# Patient Record
Sex: MALE | Race: WHITE | Employment: FULL TIME | URBAN - NONMETROPOLITAN AREA
[De-identification: names, ages, dates, MRNs, and addresses within clinical notes are randomized per-mention and may not be internally consistent; named-entity substitution may affect disease eponyms.]

---

## 2022-06-21 ENCOUNTER — HOSPITAL ENCOUNTER (OUTPATIENT)
Dept: SLEEP CENTER | Age: 42
Discharge: HOME | End: 2022-06-21
Payer: COMMERCIAL

## 2022-06-21 VITALS — BODY MASS INDEX: 29.2 KG/M2 | WEIGHT: 204 LBS | HEIGHT: 70 IN

## 2022-06-21 DIAGNOSIS — R06.83 PRIMARY SNORING: ICD-10-CM

## 2022-06-21 DIAGNOSIS — R06.81 WITNESSED APNEIC SPELLS: ICD-10-CM

## 2022-06-21 DIAGNOSIS — R40.0 HAS DAYTIME DROWSINESS: ICD-10-CM

## 2022-06-21 PROCEDURE — 95810 POLYSOM 6/> YRS 4/> PARAM: CPT

## 2022-06-21 ASSESSMENT — SLEEP AND FATIGUE QUESTIONNAIRES
HOW LIKELY ARE YOU TO NOD OFF OR FALL ASLEEP WHILE SITTING QUIETLY AFTER LUNCH WITHOUT ALCOHOL: 0
HAS ANYONE NOTICED THAT YOU QUIT BREATHING DURING SLEEP: 3-4 TIMES A WEEK
ARE YOU TIRED AFTER SLEEPING: ALMOST DAILY
HOW OFTEN DO YOU SNORE: ALMOST DAILY
DOES YOUR SNORING BOTHER OTHERS: YES
SNORING VOLUME: VERY LOUD
WHAT TIME DO YOU USUALLY GO TO BED: 75600
HOW LIKELY ARE YOU TO NOD OFF OR FALL ASLEEP WHILE SITTING INACTIVE IN A PUBLIC PLACE: 0
HOW LIKELY ARE YOU TO NOD OFF OR FALL ASLEEP WHILE SITTING AND READING: 0
DO YOU SNORE: YES
USUAL AMOUNT OF TIME TO FALL ASLEEP (MIN): 1
ESS TOTAL SCORE: 4
HOW LIKELY ARE YOU TO NOD OFF OR FALL ASLEEP WHEN YOU ARE A PASSENGER IN A CAR FOR AN HOUR WITHOUT A BREAK: 1
HOW LIKELY ARE YOU TO NOD OFF OR FALL ASLEEP IN A CAR, WHILE STOPPED FOR A FEW MINUTES IN TRAFFIC: 0
DO YOU HAVE HIGH BLOOD PRESSURE: NO
HOW LIKELY ARE YOU TO NOD OFF OR FALL ASLEEP WHILE LYING DOWN TO REST IN THE AFTERNOON WHEN CIRCUMSTANCES PERMIT: 0
HAVE YOU EVER NODDED OFF OR FALLEN ASLEEP WHILE DRIVING: NO
HOW MANY NAPS DO YOU TAKE PER WEEK: 1
WHAT TIME DO YOU USUALLY WAKE UP: 14400
HOW LIKELY ARE YOU TO NOD OFF OR FALL ASLEEP WHILE WATCHING TV: 3
I SLEEP WELL: NO
HOW LIKELY ARE YOU TO NOD OFF OR FALL ASLEEP WHILE SITTING AND TALKING TO SOMEONE: 0
ARE YOU TIRED DURING WAKE TIME: ALMOST DAILY
NUMBER OF TIMES YOU WAKE PER NIGHT: 5

## 2022-06-29 LAB — STATUS: NORMAL

## 2022-08-25 ENCOUNTER — HOSPITAL ENCOUNTER (OUTPATIENT)
Dept: SLEEP CENTER | Age: 42
Discharge: HOME | End: 2022-08-25
Payer: COMMERCIAL

## 2022-08-25 DIAGNOSIS — G47.33 OSA (OBSTRUCTIVE SLEEP APNEA): ICD-10-CM

## 2022-08-25 PROCEDURE — 95811 POLYSOM 6/>YRS CPAP 4/> PARM: CPT

## 2022-08-26 NOTE — PROGRESS NOTES
Patient arrived for a titration sleep study. Procedure was explained and all questions answered. Patient chose [facility] for DME and wore a Resmed P30I nasal pillow.

## 2022-08-31 LAB — STATUS: NORMAL

## 2022-09-01 ENCOUNTER — OFFICE VISIT (OUTPATIENT)
Dept: PULMONOLOGY | Age: 42
End: 2022-09-01
Payer: COMMERCIAL

## 2022-09-01 VITALS
DIASTOLIC BLOOD PRESSURE: 87 MMHG | WEIGHT: 207.7 LBS | HEART RATE: 70 BPM | RESPIRATION RATE: 16 BRPM | OXYGEN SATURATION: 96 % | TEMPERATURE: 97.3 F | BODY MASS INDEX: 29.73 KG/M2 | HEIGHT: 70 IN | SYSTOLIC BLOOD PRESSURE: 137 MMHG

## 2022-09-01 DIAGNOSIS — G47.33 OSA (OBSTRUCTIVE SLEEP APNEA): Primary | ICD-10-CM

## 2022-09-01 PROCEDURE — 99203 OFFICE O/P NEW LOW 30 MIN: CPT | Performed by: INTERNAL MEDICINE

## 2022-09-01 NOTE — LETTER
[facility] Part of [address] JULIEN [address] [place] [address]  Phone: [phone]  Fax: [phone]    Miriam Spivey MD    September 1, 2022     DO Emily [address]    Patient: Moris Stark   MR Number: [id number]   YOB: 1980   Date of Visit: 9/1/2022       Dear Bridger ESTES:    Thank you for referring Major Puga to me for evaluation/treatment. Below are the relevant portions of my assessment and plan of care. If you have questions, please do not hesitate to call me. I look forward to following Manish Schmitt along with you.     Sincerely,      Miriam Spivey MD

## 2022-09-01 NOTE — PATIENT INSTRUCTIONS
SURVEY:    You may be receiving a survey from [employer] regarding your visit today. Please complete the survey to enable us to provide the highest quality of care to you and your family. If you cannot score us a very good on any question, please call the office to discuss how we could have made your experience a very good one. Thank you.

## 2022-09-01 NOTE — PROGRESS NOTES
OUTPATIENT PULMONARY CONSULT NOTE      Patient:  Jorge Luis Wilkins  MRN: [id number]    Consulting Physician: Sherri Ramírez DO  Reason for Consult: Obstructive sleep apnea  Primacy Care Physician: Sherri Ramírez DO    HISTORY OF PRESENT ILLNESS:   The patient is a 40 y.o. male referred for evaluation of obstructive sleep apnea. He has history of long-term snoring for a long time waking up at night with a snoring and sometimes gasping. He was told by his wife about snoring and was having witnessed apnea so he was seen by the primary care physician and a sleep study was ordered. Baseline sleep study was done on 06/21/2022 which was consistent with moderate obstructive sleep apnea with AHI of 26. He had a titration study done on 08/25/2022 and he was titrated to a CPAP of 11 cm and it was recommended to use 10 cm. He does have daytime sleepiness he is not energetic during the daytime. When he wake up in the morning the sleep is not refreshing he still feels tired when he wake up in the morning he does have history of his snoring waking up at night with snoring and gasping and frequent awakening witnessed apnea he can doze off sometimes take nap for 10 minutes and some days he can sleep for an hour if he is not working. He does not complain of shortness of breath no cough no wheezing and no chest tightness. He is able to do his regular activities without much problem. He did have history of smoking which he stopped 5 years ago and smoked for about 14 to 15 years in the past.    Sleep questionnaire  Snores at night, wakes himself snoring. Has witnessed apnea. Wakes up with choking and gasping sensation. Positive dry mouth upon awakening. Positive fatigue and tiredness during the day. Goes to sleep at 8-11pm, wakes up 5 am. It takes 5-10 minutes to fall asleep. Wakes up 1 times at night to go to bathroom. Takes 1 nap during the day sometime( 10 minutes).  No headache in am. No car wrecks or near wrecks because of the sleepiness. No nodding off while driving. No weight gain. No forgetfulness or decreased concentration. No nasal congestion or obstruction at night. No significant caffienated drinks or alcohol. Positive restless feelings in legs at night. No numbness or burning in leg or feet. No leg aches or cramps . No loss of muscle strength when angry or laugh. No hallucination when dozing off or waking up from sleep. No paralysis upon awakening from sleep or going to sleep. occasional teeth grinding, no nightmares, sleep walking. No night time panic attacks. Sleep Medicine 6/21/2022   Sitting and reading 0   Watching TV 3   Sitting, inactive in a public place (e.g. a theatre or a meeting) 0   As a passenger in a car for an hour without a break 1   Lying down to rest in the afternoon when circumstances permit 0   Sitting and talking to someone 0   Sitting quietly after a lunch without alcohol 0   In a car, while stopped for a few minutes in traffic 0   [place] Sleepiness Score 4   Neck circumference (Inches) 15       Past Medical History:        Diagnosis Date    Anxiety     Fatigue        Past Surgical History:        Procedure Laterality Date    CONDYLOMA EXCISION      INGUINAL HERNIA REPAIR Bilateral        Allergies: Allergies   Allergen Reactions    Erythromycin          Home Meds:   Outpatient Encounter Medications as of 9/1/2022   Medication Sig Dispense Refill    [DISCONTINUED] FLUoxetine (PROZAC) 20 MG capsule Take 1 capsule by mouth daily (Patient not taking: Reported on 6/21/2022) 30 capsule 2     No facility-administered encounter medications on file as of 9/1/2022. Social History:   TOBACCO:   reports that he quit smoking about 5 years ago. His smoking use included cigarettes. He has never used smokeless tobacco.  ETOH:   reports current alcohol use of about 2.0 standard drinks per week.   OCCUPATION:      Family History:       Problem Relation Age of Onset    Other Father     Diabetes Paternal Uncle Cancer Maternal Grandmother        Immunizations: There is no immunization history on file for this patient.       REVIEW OF SYSTEMS:  CONSTITUTIONAL: Positive for fatigue negative for  fevers, chills, sweats,  anorexia, and weight loss  EYES:  negative for  double vision, blurred vision, dry eyes, eye discharge, visual disturbance, redness, and icterus  HEENT:  negative for  hearing loss, tinnitus, ear drainage, earaches, nasal congestion, epistaxis, sore throat, hoarseness, voice change, and postnasal drip  RESPIRATORY:  negative for  dry cough, cough with sputum, dyspnea, wheezing, hemoptysis, chest pain, and pleuritic pain  CARDIOVASCULAR:  negative for  chest pain, dyspnea, palpitations, orthopnea, PND, exertional chest pressure/discomfort, fatigue, edema, syncope  GASTROINTESTINAL:  negative for nausea, vomiting, diarrhea, constipation, abdominal pain, abdominal mass, abdominal distention, jaundice, dysphagia, reflux, odynophagia, hematemesis, and hemtochezia  GENITOURINARY:  negative for frequency, dysuria, nocturia, and hematuria  HEMATOLOGIC/LYMPHATIC:  negative for easy bruising, bleeding, lymphadenopathy, and petechiae  ALLERGIC/IMMUNOLOGIC:  negative for recurrent infections, urticaria, hay fever, angioedema, anaphylaxis, and drug reactions  ENDOCRINE:  negative for heat intolerance, cold intolerance, tremor, and weight changes  MUSCULOSKELETAL:  negative for  myalgias, arthralgias, joint swelling, stiff joints, and muscle weakness  NEUROLOGICAL:  negative for headaches, dizziness, seizures, memory problems, speech problems, visual disturbance, gait problems, tremor, dysphagia, weakness, numbness, syncope, and tingling  BEHAVIOR/PSYCH:  negative for decreased sleep, decreased energy level, increased energy level, poor concentration, depressed mood, and anxiety  SLEEP: snoring, choking, gasping, periods of not breathing, excessive daytime sleepiness, falling asleep while at work, driving, reading, watching television, talking, disrupted sleep, naps        Physical Exam:    Vitals: /87   Pulse 70   Temp 97.3 °F (36.3 °C)   Resp 16   Ht 5' 10\" (1.778 m)   Wt 207 lb 11.2 oz (94.2 kg)   SpO2 96%   BMI 29.80 kg/m²   Last 3 weights: Wt Readings from Last 3 Encounters:   09/01/22 207 lb 11.2 oz (94.2 kg)   06/21/22 204 lb (92.5 kg)   05/25/22 204 lb (92.5 kg)     Body mass index is 29.8 kg/m². Physical Examination:   General appearance - alert, well appearing, and in no distress, oriented to person, place, and time, overweight, and acyanotic, in no respiratory distress  Mental status - alert, oriented to person, place, and time  Eyes - pupils equal and reactive, extraocular eye movements intact  Ears - right ear normal, left ear normal  Nose - normal and patent, no erythema, discharge or polyps  Mouth - mucous membranes moist, pharynx normal without lesions and large tongue, small oropharynx, Mallampati 2-3, retrognathia present  Neck - supple, no significant adenopathy  Chest - no tachypnea, retractions or cyanosis bilateral symmetrical chest movement, normal resonance on percussion, air entry is present bilaterally and symmetrical, no expiratory wheezing rhonchi or crackles.   Heart - normal rate, regular rhythm, normal S1, S2, no murmurs, rubs, clicks or gallops  Abdomen - soft, nontender, nondistended, no masses or organomegaly  Neurological - alert, oriented, normal speech, no focal findings or movement disorder noted}  Extremities - peripheral pulses normal, no pedal edema, no clubbing or cyanosis large tongue, small  Skin - normal coloration and turgor, no rashes, no suspicious skin lesions noted       LABS:    CBC: No results found for: WBC, HGB, PLT  BMP: No results found for: NA, K, CL, CO2, BUN, CREATININE, GLUCOSE  Hepatic: No results found for: AST, ALT, ALB, BILITOT, ALKPHOS  Amylase: No results found for: AMYLASE  Lipase: No results found for: LIPASE  CARDIAC ENZYMES: No results found for: CKTOTAL, CKMB, CKMBINDEX, TROPONINI  BNP: No results found for: BNP  Lipids: No results found for: CHOL, HDL    INR: No results found for: INR  Thyroid: No results found for: T4, TSH  Urinalysis: No results found for: BACTERIA, BLOODU, CLARITYU, COLORU, PHUR, PROTEINU, RBCUA, SPECGRAV, BILIRUBINUR, NITRU, WBCUA, LEUKOCYTESUR, GLUCOSEU  Cultures:-  -----------------------------------------------------------------    ABGs: No results found for: PHART, PO2ART, [id number]    Pulmonary Functions Testing Results:    No results found for: FEV1, FVC, [id number], TLC, DLCO    CXR        Assessment and Plan       ICD-10-CM    1. STEPHANY (obstructive sleep apnea)  G47.33           There is no problem list on file for this patient. Assessment:    Moderate obstructive sleep apnea according to sleep study in June and he is titrated with CPAP of 11 cm he does have retrognathia is moderate and Mallampati 2-3 most likely anatomy along with his weight causing him to have his snoring and sleep apnea. I have discussed with him about the sleep apnea sleep study benefit of treatment as he is having symptoms and he will benefit from CPAP therapy he agrees. Will start him on auto CPAP with pressure minimum of 6 and pressure maximum of 16 and will monitor compliance on the CPAP and the symptom. Plan and recommendation:    Start auto CPAP with the min 6 and P max 16. Discussed with patient to use CPAP at least 4 hrs qhs  Wt loss is recommended and discussed  Follow good sleep hygeine instructions  Use humidifier  Questions answered pertaining to diagnosis and management explained importance of compliance with therapy   Need compliance data before next visit once he get CPAP and start using CPAP. Vaccinations recommended annually for flu in fall  Up to date with [facility] an active lifestyle        RTC 3-4  months    It was my pleasure to evaluate Harpreet Mejias today.   Please call with questions. Nguyen Ledezma MD, MD             9/1/2022, 2:39 PM     Please note that this chart was generated using voice recognition Dragon dictation software. Although every effort was made to ensure the accuracy of this automated transcription, some errors in transcription may have occurred.

## 2022-11-10 ENCOUNTER — OFFICE VISIT (OUTPATIENT)
Dept: PULMONOLOGY | Age: 42
End: 2022-11-10
Payer: COMMERCIAL

## 2022-11-10 VITALS
TEMPERATURE: 97.5 F | HEIGHT: 70 IN | RESPIRATION RATE: 18 BRPM | WEIGHT: 213.2 LBS | SYSTOLIC BLOOD PRESSURE: 118 MMHG | HEART RATE: 87 BPM | DIASTOLIC BLOOD PRESSURE: 71 MMHG | BODY MASS INDEX: 30.52 KG/M2 | OXYGEN SATURATION: 96 %

## 2022-11-10 DIAGNOSIS — G47.33 OSA (OBSTRUCTIVE SLEEP APNEA): Primary | ICD-10-CM

## 2022-11-10 PROCEDURE — 99213 OFFICE O/P EST LOW 20 MIN: CPT | Performed by: INTERNAL MEDICINE

## 2022-11-10 NOTE — PROGRESS NOTES
OUTPATIENT PULMONARY PROGRESS NOTE      Patient:  Valerie Rubi  MRN: [id number]    Consulting Physician: Kaleb Gibbons DO  Reason for Consult: Obstructive sleep apnea  Primacy Care Physician: Kaleb Gibbons DO    HISTORY OF PRESENT ILLNESS:   The patient is a 40 y.o. male referred for evaluation of obstructive sleep apnea. He is here today for follow-up he was seen last time 2 months ago. He was started on CPAP around third week of September when he received his CPAP apparently CPAP order was auto CPAP but he was started on 10 cm pressure. He is on nasal pillows with CPAP. According to patient he is taking time to getting adjusted to the CPAP. Sometimes he wake up at night when he gets extra increased pressure from the CPAP. He does not have significant problem with the leak. His dry mouth is actually better since he started using CPAP. He does not find himself gasping or choking at night and snoring at night. He still feels like that the morning are not as good as before but getting better. According patient he is feeling more energy during the daytime more active he does not taking naps anymore and before her CPAP was started he was feeling that he will need to go to sleep at lunchtime and he take nap now he does not do that anymore. He usually goes to sleep around 8-10 PM and wake up around between 4 to 6:30 in the morning because of his work some night he has to be on call and get because and that night he does not use the CPAP as he get frequent because at night. CPAP compliance data is not available. According to patient he is getting data on his phone and he is AHI is between 0.6-0.8 and he is using 7 hours or more most of the night. Initial history and office visit on 09/01/2022  He has history of long-term snoring for a long time waking up at night with a snoring and sometimes gasping.   He was told by his wife about snoring and was having witnessed apnea so he was seen by the primary care physician and a sleep study was ordered. Baseline sleep study was done on 06/21/2022 which was consistent with moderate obstructive sleep apnea with AHI of 26. He had a titration study done on 08/25/2022 and he was titrated to a CPAP of 11 cm and it was recommended to use 10 cm. He does have daytime sleepiness he is not energetic during the daytime. When he wake up in the morning the sleep is not refreshing he still feels tired when he wake up in the morning he does have history of his snoring waking up at night with snoring and gasping and frequent awakening witnessed apnea he can doze off sometimes take nap for 10 minutes and some days he can sleep for an hour if he is not working. He does not complain of shortness of breath no cough no wheezing and no chest tightness. He is able to do his regular activities without much problem. He did have history of smoking which he stopped 5 years ago and smoked for about 14 to 15 years in the past.    Sleep questionnaire on 11/10/22  Denies nocturnal awakening with choking gasping or snoring. . Negative dry mouth upon awakening. Negative fatigue and tiredness during the day. Goes to sleep at 8-10 pm, wakes up 4- 630 am. It takes 10 to 15 minutes to fall asleep. Wakes up 0-1 times at night to go to bathroom. Takes no nap during the day. No headache in am. No car wrecks or near wrecks because of the sleepiness. No nodding off while driving. No weight gain. No forgetfulness or decreased concentration. No nasal congestion or obstruction at night. No significant caffienated drinks or alcohol. Positive restless feelings in legs at night. No numbness or burning in leg or feet. No leg aches or cramps . No loss of muscle strength when angry or laugh. No hallucination when dozing off or waking up from sleep. No paralysis upon awakening from sleep or going to sleep. occasional teeth grinding, no nightmares, sleep walking. No night time panic attacks.      Sleep Medicine 6/21/2022 Sitting and reading 0   Watching TV 3   Sitting, inactive in a public place (e.g. a theatre or a meeting) 0   As a passenger in a car for an hour without a break 1   Lying down to rest in the afternoon when circumstances permit 0   Sitting and talking to someone 0   Sitting quietly after a lunch without alcohol 0   In a car, while stopped for a few minutes in traffic 0   [place] Sleepiness Score 4   Neck circumference (Inches) 15       Past Medical History:        Diagnosis Date    Anxiety     Fatigue        Past Surgical History:        Procedure Laterality Date    CONDYLOMA EXCISION      INGUINAL HERNIA REPAIR Bilateral        Allergies: Allergies   Allergen Reactions    Erythromycin          Home Meds:   No outpatient encounter medications on file as of 11/10/2022. No facility-administered encounter medications on file as of 11/10/2022. Social History:   TOBACCO:   reports that he quit smoking about 5 years ago. His smoking use included cigarettes. He has never used smokeless tobacco.  ETOH:   reports current alcohol use of about 2.0 standard drinks per week. OCCUPATION:      Family History:       Problem Relation Age of Onset    Other Father     Diabetes Paternal Uncle     Cancer Maternal Grandmother        Immunizations: There is no immunization history on file for this patient.       REVIEW OF SYSTEMS:  CONSTITUTIONAL: Negative for fatigue negative for  fevers, chills, sweats,  anorexia, and weight loss  EYES:  negative for  double vision, blurred vision, dry eyes, eye discharge, visual disturbance, redness, and icterus  HEENT:  negative for  hearing loss, tinnitus, ear drainage, earaches, nasal congestion, epistaxis, sore throat, hoarseness, voice change, and postnasal drip  RESPIRATORY:  negative for  dry cough, cough with sputum, dyspnea, wheezing, hemoptysis, chest pain, and pleuritic pain  CARDIOVASCULAR:  negative for  chest pain, dyspnea, palpitations, orthopnea, PND, exertional chest pressure/discomfort, fatigue, edema, syncope  GASTROINTESTINAL:  negative for nausea, vomiting, diarrhea, constipation, abdominal pain, abdominal mass, abdominal distention, jaundice, dysphagia, reflux, odynophagia, hematemesis, and hemtochezia  GENITOURINARY:  negative for frequency, dysuria, nocturia, and hematuria  HEMATOLOGIC/LYMPHATIC:  negative for easy bruising, bleeding, lymphadenopathy, and petechiae  ALLERGIC/IMMUNOLOGIC:  negative for recurrent infections, urticaria, hay fever, angioedema, anaphylaxis, and drug reactions  ENDOCRINE:  negative for heat intolerance, cold intolerance, tremor, and weight changes  MUSCULOSKELETAL:  negative for  myalgias, arthralgias, joint swelling, stiff joints, and muscle weakness  NEUROLOGICAL:  negative for headaches, dizziness, seizures, memory problems, speech problems, visual disturbance, gait problems, tremor, dysphagia, weakness, numbness, syncope, and tingling  BEHAVIOR/PSYCH:  negative for decreased sleep, decreased energy level, increased energy level, poor concentration, depressed mood, and anxiety          Physical Exam:    Vitals: /71 (Site: Right Upper Arm, Position: Sitting, Cuff Size: Medium Adult)   Pulse 87   Temp 97.5 °F (36.4 °C) (Temporal)   Resp 18   Ht 5' 10\" (1.778 m)   Wt 213 lb 3.2 oz (96.7 kg)   SpO2 96%   BMI 30.59 kg/m²   Last 3 weights: Wt Readings from Last 3 Encounters:   11/10/22 213 lb 3.2 oz (96.7 kg)   09/01/22 207 lb 11.2 oz (94.2 kg)   06/21/22 204 lb (92.5 kg)     Body mass index is 30.59 kg/m².     Physical Examination:   General appearance - alert, well appearing, and in no distress, oriented to person, place, and time, overweight, and acyanotic, in no respiratory distress  Mental status - alert, oriented to person, place, and time  Eyes - pupils equal and reactive, extraocular eye movements intact  Ears - right ear normal, left ear normal  Nose - normal and patent, no erythema, discharge or polyps  Mouth - mucous membranes moist, pharynx normal without lesions and large tongue, small oropharynx, Mallampati 2-3, retrognathia present  Neck - supple, no significant adenopathy  Chest - no tachypnea, retractions or cyanosis bilateral symmetrical chest movement, normal resonance on percussion, air entry is present bilaterally and symmetrical, no expiratory wheezing rhonchi or crackles. Heart - normal rate, regular rhythm, normal S1, S2, no murmurs, rubs, clicks or gallops  Abdomen - soft, nontender, nondistended, no masses or organomegaly  Neurological - alert, oriented, normal speech, no focal findings or movement disorder noted}  Extremities - peripheral pulses normal, no pedal edema, no clubbing or cyanosis large tongue, small  Skin - normal coloration and turgor, no rashes, no suspicious skin lesions noted       LABS:    CBC: No results found for: WBC, HGB, PLT  BMP: No results found for: NA, K, CL, CO2, BUN, CREATININE, GLUCOSE  Hepatic: No results found for: AST, ALT, ALB, BILITOT, ALKPHOS  Amylase: No results found for: AMYLASE  Lipase: No results found for: LIPASE  CARDIAC ENZYMES: No results found for: CKTOTAL, CKMB, CKMBINDEX, TROPONINI  BNP: No results found for: BNP  Lipids: No results found for: CHOL, HDL    INR: No results found for: INR  Thyroid: No results found for: T4, TSH  Urinalysis: No results found for: BACTERIA, BLOODU, CLARITYU, COLORU, PHUR, PROTEINU, RBCUA, SPECGRAV, BILIRUBINUR, NITRU, WBCUA, LEUKOCYTESUR, GLUCOSEU  Cultures:-  -----------------------------------------------------------------    ABGs: No results found for: PHART, PO2ART, [id number]    Pulmonary Functions Testing Results:    No results found for: FEV1, FVC, [id number], TLC, DLCO    CXR        Assessment and Plan       ICD-10-CM    1. STEPHANY (obstructive sleep apnea)  G47.33           There is no problem list on file for this patient.     Assessment:    Moderate obstructive sleep apnea according to sleep study in June and he is titrated with CPAP of 11 cm he does have retrognathia is moderate and Mallampati 2-3 most likely anatomy along with his weight causing him to have his snoring and sleep apnea. I have discussed with him about the sleep apnea sleep study benefit of treatment as he is having symptoms and he will benefit from CPAP therapy he agrees. Is currently on CPAP at 10 cm he is getting benefit from CPAP use and his pressure is not too much according to patient his sleep quality is getting better mornings are getting better. He is adjusting to CPAP and doing better. He is getting benefit from CPAP      Plan and recommendation:    Continue CPAP at 10 cm. Discussed with patient to use CPAP at least 4 hrs qhs  Wt loss is recommended and discussed  Follow good sleep hygeine instructions  Use humidifier  Questions answered pertaining to diagnosis and management explained importance of compliance with therapy   Will get compliance data and review compliance data from CPAP  Need compliance data before next visit. Vaccinations recommended annually for flu in fall  Up to date with vaccinations from [facility] an active lifestyle        RTC 6  months    It was my pleasure to evaluate Kenan Renteria today. Please call with questions. Caridad Aguirre MD, MD             11/10/2022, 9:31 AM     Please note that this chart was generated using voice recognition Dragon dictation software. Although every effort was made to ensure the accuracy of this automated transcription, some errors in transcription may have occurred.

## 2022-11-10 NOTE — PATIENT INSTRUCTIONS
SURVEY:    You may be receiving a survey from [employer] regarding your visit today. Please complete the survey to enable us to provide the highest quality of care to you and your family. If you cannot score us a very good on any question, please call the office to discuss how we could have made your experience a very good one. Thank you.

## 2023-05-17 ENCOUNTER — OFFICE VISIT (OUTPATIENT)
Dept: PULMONOLOGY | Age: 43
End: 2023-05-17
Payer: COMMERCIAL

## 2023-05-17 VITALS
BODY MASS INDEX: 33.12 KG/M2 | WEIGHT: 223.6 LBS | OXYGEN SATURATION: 97 % | DIASTOLIC BLOOD PRESSURE: 80 MMHG | TEMPERATURE: 96.2 F | HEART RATE: 68 BPM | RESPIRATION RATE: 16 BRPM | SYSTOLIC BLOOD PRESSURE: 137 MMHG | HEIGHT: 69 IN

## 2023-05-17 DIAGNOSIS — G47.33 OSA (OBSTRUCTIVE SLEEP APNEA): Primary | ICD-10-CM

## 2023-05-17 PROCEDURE — 99213 OFFICE O/P EST LOW 20 MIN: CPT | Performed by: NURSE PRACTITIONER

## 2023-05-17 ASSESSMENT — ENCOUNTER SYMPTOMS
EYES NEGATIVE: 1
RESPIRATORY NEGATIVE: 1
GASTROINTESTINAL NEGATIVE: 1
ALLERGIC/IMMUNOLOGIC NEGATIVE: 1

## 2023-05-17 NOTE — PATIENT INSTRUCTIONS
No protocol.    Last OV: 6/16/2022   Upcoming OV: 6/16/2023   SURVEY:    You may be receiving a survey from [employer] regarding your visit today. Please complete the survey to enable us to provide the highest quality of care to you and your family. If you cannot score us a very good on any question, please call the office to discuss how we could have made your experience a very good one. Thank you.

## 2024-04-05 ENCOUNTER — TELEPHONE (OUTPATIENT)
Dept: PRIMARY CARE CLINIC | Age: 44
End: 2024-04-05

## 2024-04-05 NOTE — TELEPHONE ENCOUNTER
----- Message from Stephanie Leigh sent at 4/5/2024  3:07 PM EDT -----  Subject: Appointment Request    Reason for Call: New Patient/New to Provider Appointment needed: New   Patient Request Appointment    QUESTIONS    Reason for appointment request? No appointments available during search     Additional Information for Provider? pt would like to see Luis Massey -   his wife is a patient   ---------------------------------------------------------------------------  --------------  CALL BACK INFO  [phone]; OK to leave message on voicemail,OK to respond with   electronic message via [employer] portal (only for patients who have   registered [employer] account)  ---------------------------------------------------------------------------  --------------  SCRIPT ANSWERS

## 2024-04-24 ENCOUNTER — OFFICE VISIT (OUTPATIENT)
Dept: PRIMARY CARE CLINIC | Age: 44
End: 2024-04-24
Payer: COMMERCIAL

## 2024-04-24 VITALS
HEIGHT: 68 IN | TEMPERATURE: 98.5 F | OXYGEN SATURATION: 98 % | WEIGHT: 226.5 LBS | DIASTOLIC BLOOD PRESSURE: 80 MMHG | HEART RATE: 67 BPM | SYSTOLIC BLOOD PRESSURE: 124 MMHG | BODY MASS INDEX: 34.33 KG/M2

## 2024-04-24 DIAGNOSIS — Z13.220 LIPID SCREENING: ICD-10-CM

## 2024-04-24 DIAGNOSIS — Z13.1 DIABETES MELLITUS SCREENING: ICD-10-CM

## 2024-04-24 DIAGNOSIS — Z00.00 WELLNESS EXAMINATION: Primary | ICD-10-CM

## 2024-04-24 PROCEDURE — 99386 PREV VISIT NEW AGE 40-64: CPT | Performed by: NURSE PRACTITIONER

## 2024-04-24 SDOH — ECONOMIC STABILITY: FOOD INSECURITY: WITHIN THE PAST 12 MONTHS, THE FOOD YOU BOUGHT JUST DIDN'T LAST AND YOU DIDN'T HAVE MONEY TO GET MORE.: PATIENT DECLINED

## 2024-04-24 SDOH — ECONOMIC STABILITY: HOUSING INSECURITY
IN THE LAST 12 MONTHS, WAS THERE A TIME WHEN YOU DID NOT HAVE A STEADY PLACE TO SLEEP OR SLEPT IN A SHELTER (INCLUDING NOW)?: PATIENT DECLINED

## 2024-04-24 SDOH — ECONOMIC STABILITY: INCOME INSECURITY: HOW HARD IS IT FOR YOU TO PAY FOR THE VERY BASICS LIKE FOOD, HOUSING, MEDICAL CARE, AND HEATING?: PATIENT DECLINED

## 2024-04-24 SDOH — ECONOMIC STABILITY: FOOD INSECURITY: WITHIN THE PAST 12 MONTHS, YOU WORRIED THAT YOUR FOOD WOULD RUN OUT BEFORE YOU GOT MONEY TO BUY MORE.: PATIENT DECLINED

## 2024-04-24 ASSESSMENT — ENCOUNTER SYMPTOMS
ABDOMINAL PAIN: 0
NAUSEA: 0
CONSTIPATION: 0
SHORTNESS OF BREATH: 0
WHEEZING: 0
VOMITING: 0
RHINORRHEA: 0
SORE THROAT: 0
COUGH: 0
DIARRHEA: 0

## 2024-04-24 ASSESSMENT — PATIENT HEALTH QUESTIONNAIRE - PHQ9
SUM OF ALL RESPONSES TO PHQ9 QUESTIONS 1 & 2: 0
SUM OF ALL RESPONSES TO PHQ QUESTIONS 1-9: 0
2. FEELING DOWN, DEPRESSED OR HOPELESS: NOT AT ALL
1. LITTLE INTEREST OR PLEASURE IN DOING THINGS: NOT AT ALL
SUM OF ALL RESPONSES TO PHQ QUESTIONS 1-9: 0

## 2024-04-24 NOTE — PATIENT INSTRUCTIONS
SURVEY:     You may be receiving a survey from [facility] [employer] regarding your visit today.     Please complete the survey to enable us to provide the highest quality of care to you and your family.     If you cannot score us a very good on any question, please call the office to discuss how we could have made your experience a very good one.     Thank you,    Luis Massey, APRN-CNP  Molly Nj, APRN-CNP  Romi, LPN  Ayleen, CMA  Da, CMA  Bina, CMA  Cathie, PCA  Lizzie, CMA  Elisabeth, PM

## 2024-04-24 NOTE — PROGRESS NOTES
Name: Aj Morris  : 1980         Chief Complaint:     Chief Complaint   Patient presents with    Establish Care     Previous PCP Dr. Simon. C/O bilateral side pain,denies injury.       History of Present Illness:      Aj Morris is a 44 y.o.  male who presents with Establish Care (Previous PCP Dr. Simon. C/O bilateral side pain,denies injury.)      Williams is here today for a routine wellness exam and to establish care.    Overall he is feeling well.  He does complain of some rib tenderness especially when his wife gives him a hug.  He denies any specific injury.  He states the pain is not always there but is only present with palpation.  He has a rather sedentary job in an office.  He does try to exercise and eat well routinely.  He is to work out more routinely.  He is taking no current medications.  He does have a history of hyperlipidemia.  He is due for screening labs.    He lives at home with his wife and 17-year-old daughter.          Past Medical History:     Past Medical History:   Diagnosis Date    Fatigue       Reviewed all health maintenance requirements and ordered appropriate tests  There are no preventive care reminders to display for this patient.    Past Surgical History:     Past Surgical History:   Procedure Laterality Date    CONDYLOMA EXCISION      INGUINAL HERNIA REPAIR Bilateral         Medications:       Prior to Admission medications    Medication Sig Start Date End Date Taking? Authorizing Provider   Misc. Devices (CPAP MACHINE) MISC by Does not apply route   Yes Provider, Historical, MD        Allergies:       Erythromycin    Social History:     Tobacco:    reports that he quit smoking about 7 years ago. His smoking use included cigarettes. He has never been exposed to tobacco smoke. He has never used smokeless tobacco.  Alcohol:      reports current alcohol use of about 2.0 standard drinks of alcohol per week.  Drug Use:  reports no history of drug use.    Family

## 2024-06-04 ENCOUNTER — OFFICE VISIT (OUTPATIENT)
Dept: PRIMARY CARE CLINIC | Age: 44
End: 2024-06-04

## 2024-06-04 VITALS
DIASTOLIC BLOOD PRESSURE: 74 MMHG | HEART RATE: 78 BPM | TEMPERATURE: 98.5 F | OXYGEN SATURATION: 99 % | WEIGHT: 225.4 LBS | SYSTOLIC BLOOD PRESSURE: 122 MMHG | RESPIRATION RATE: 20 BRPM | BODY MASS INDEX: 34.16 KG/M2

## 2024-06-04 DIAGNOSIS — J30.1 SEASONAL ALLERGIC RHINITIS DUE TO POLLEN: Primary | ICD-10-CM

## 2024-06-04 DIAGNOSIS — J45.20 MILD INTERMITTENT EXTRINSIC ASTHMA WITHOUT COMPLICATION: ICD-10-CM

## 2024-06-04 RX ORDER — LEVOCETIRIZINE DIHYDROCHLORIDE 5 MG/1
5 TABLET, FILM COATED ORAL NIGHTLY
Qty: 90 TABLET | Refills: 0 | Status: SHIPPED | OUTPATIENT
Start: 2024-06-04

## 2024-06-04 RX ORDER — TRIAMCINOLONE ACETONIDE 40 MG/ML
80 INJECTION, SUSPENSION INTRA-ARTICULAR; INTRAMUSCULAR ONCE
Status: COMPLETED | OUTPATIENT
Start: 2024-06-04 | End: 2024-06-04

## 2024-06-04 RX ADMIN — TRIAMCINOLONE ACETONIDE 80 MG: 40 INJECTION, SUSPENSION INTRA-ARTICULAR; INTRAMUSCULAR at 12:18

## 2024-06-04 ASSESSMENT — ENCOUNTER SYMPTOMS
WHEEZING: 0
SINUS PRESSURE: 0
HOARSE VOICE: 1
SPUTUM PRODUCTION: 1
SHORTNESS OF BREATH: 0
CONSTIPATION: 0
VOMITING: 0
TROUBLE SWALLOWING: 0
CHEST TIGHTNESS: 0
ABDOMINAL PAIN: 0
DIARRHEA: 0
SORE THROAT: 0
HEMOPTYSIS: 0
FREQUENT THROAT CLEARING: 0
EYE ITCHING: 0
NAUSEA: 0
SWOLLEN GLANDS: 0
COUGH: 1
SINUS PAIN: 0
HEARTBURN: 0
DIFFICULTY BREATHING: 0
RHINORRHEA: 1

## 2024-06-04 ASSESSMENT — PATIENT HEALTH QUESTIONNAIRE - PHQ9
1. LITTLE INTEREST OR PLEASURE IN DOING THINGS: NOT AT ALL
SUM OF ALL RESPONSES TO PHQ QUESTIONS 1-9: 0
SUM OF ALL RESPONSES TO PHQ9 QUESTIONS 1 & 2: 0
SUM OF ALL RESPONSES TO PHQ QUESTIONS 1-9: 0
SUM OF ALL RESPONSES TO PHQ QUESTIONS 1-9: 0
2. FEELING DOWN, DEPRESSED OR HOPELESS: NOT AT ALL
SUM OF ALL RESPONSES TO PHQ QUESTIONS 1-9: 0

## 2024-06-04 NOTE — PROGRESS NOTES
Name: Aj Morris  : 1980         Chief Complaint:     Chief Complaint   Patient presents with    Chest Congestion     X 2 weeks,intermittently, no fever.        History of Present Illness:      Aj Morris is a 44 y.o.  male who presents with Chest Congestion (X 2 weeks,intermittently, no fever. )      Williams is here today for an acute care office visit.    Allergies  Presents for initial visit. He complains of congestion, cough, headaches, hoarse voice, itchy nose, rhinorrhea and sneezing. He reports no ear pain, eye itching, fatigue, fever, plugged ear sensation, rash, sinus pressure, sore throat, swollen glands or wheezing. The problem occurs daily. Timing of symptoms is not consistent. Symptom severity has been moderate, interfering with activities and interfering with sleep. Symptoms are present in the spring. Allergy triggers include animal exposure, dust, grass, pollens, weather changes and weeds. Allergy triggers do not include emotional upset, exercise, fumes, occupational exposure, smoke exposure or strong odors. Allergens in current environment include grass, dogs and cats. Past treatments include oral decongestants. The treatment provided mild relief. His past medical history is significant for allergies, asthma and bronchitis. There is no history of atopic dermatitis, nasal polyps or sinus disease. He has had no previous allergy testing.   Asthma  He complains of cough, hoarse voice and sputum production. There is no chest tightness, difficulty breathing, frequent throat clearing, hemoptysis, shortness of breath or wheezing. This is a recurrent problem. The current episode started 1 to 4 weeks ago. The problem occurs intermittently. The problem has been unchanged. The cough is productive of sputum. Associated symptoms include headaches, malaise/fatigue, nasal congestion, postnasal drip, rhinorrhea and sneezing. Pertinent negatives include no appetite change, chest pain, dyspnea on

## 2024-06-04 NOTE — PROGRESS NOTES
After obtaining consent, and per orders of   Luis Massey CNP, injection of Kenalog 80 mg given in Left deltoid by NORMA SHUKLA LPN. Patient instructed to remain in clinic for 20 minutes afterwards, and to report any adverse reaction to me immediately.

## 2024-06-04 NOTE — PATIENT INSTRUCTIONS
SURVEY:     You may be receiving a survey from [facility] [email or web address] regarding your visit today.     Please complete the survey to enable us to provide the highest quality of care to you and your family.     If you cannot score us a very good on any question, please call the office to discuss how we could have made your experience a very good one.     Thank you,    Luis Massey, APRN-CNP  Molly Nj, APRN-CNP  Romi, LPN  Ayleen, CMA  Da, CMA  Bina, CMA  Cathie, PCA  Lizzie, CMA  Elisabeth, PM

## 2024-06-05 ENCOUNTER — TELEPHONE (OUTPATIENT)
Dept: PRIMARY CARE CLINIC | Age: 44
End: 2024-06-05

## 2024-09-27 LAB
CHOLEST SERPL-MCNC: 274 MG/DL (ref 0–199)
CHOLESTEROL/HDL RATIO: 6
GLUCOSE SERPL-MCNC: 121 MG/DL (ref 74–99)
HDLC SERPL-MCNC: 44 MG/DL
LDLC SERPL CALC-MCNC: 177 MG/DL (ref 0–100)
PATIENT FASTING?: YES
TRIGL SERPL-MCNC: 265 MG/DL
VLDLC SERPL CALC-MCNC: 53 MG/DL

## 2024-09-30 ENCOUNTER — TELEPHONE (OUTPATIENT)
Dept: PRIMARY CARE CLINIC | Age: 44
End: 2024-09-30

## 2024-09-30 DIAGNOSIS — E78.5 DYSLIPIDEMIA: Primary | ICD-10-CM

## 2024-09-30 NOTE — TELEPHONE ENCOUNTER
----- Message from LILLIE Holland CNP sent at 9/30/2024 12:12 PM EDT -----  \"Bad\" cholesterol still pretty high. Would recommend he get  (lipoprotein a) blood test to see if this is hereditary. Please let me know. Thank you.

## 2024-10-02 NOTE — TELEPHONE ENCOUNTER
Patient called the office back to receive lab results. Patient is willing to have the Lipoprotein lab drawn.     Please advise.

## 2024-11-04 ENCOUNTER — HOSPITAL ENCOUNTER (OUTPATIENT)
Age: 44
Discharge: HOME | End: 2024-11-04
Payer: COMMERCIAL

## 2024-11-04 DIAGNOSIS — E78.5 DYSLIPIDEMIA: ICD-10-CM

## 2024-11-04 PROCEDURE — 83695 ASSAY OF LIPOPROTEIN(A): CPT

## 2024-11-04 PROCEDURE — 36415 COLL VENOUS BLD VENIPUNCTURE: CPT

## 2024-11-06 ENCOUNTER — TELEPHONE (OUTPATIENT)
Dept: PRIMARY CARE CLINIC | Age: 44
End: 2024-11-06

## 2024-11-06 LAB — LPA SERPL-MCNC: <6 MG/DL

## 2024-11-06 NOTE — TELEPHONE ENCOUNTER
----- Message from LILLIE Holland CNP sent at 11/6/2024  8:30 AM EST -----  Lipoprotein a is normal.  His elevated cholesterol is most likely just related to his diet.

## 2025-01-21 ENCOUNTER — OFFICE VISIT (OUTPATIENT)
Dept: PRIMARY CARE CLINIC | Age: 45
End: 2025-01-21
Payer: COMMERCIAL

## 2025-01-21 VITALS
OXYGEN SATURATION: 98 % | TEMPERATURE: 97.4 F | SYSTOLIC BLOOD PRESSURE: 148 MMHG | DIASTOLIC BLOOD PRESSURE: 102 MMHG | BODY MASS INDEX: 36.51 KG/M2 | HEART RATE: 68 BPM | WEIGHT: 240.9 LBS

## 2025-01-21 DIAGNOSIS — R73.01 ELEVATED FASTING GLUCOSE: ICD-10-CM

## 2025-01-21 DIAGNOSIS — F43.9 STRESS: Primary | ICD-10-CM

## 2025-01-21 DIAGNOSIS — R03.0 ELEVATED BLOOD PRESSURE READING: ICD-10-CM

## 2025-01-21 DIAGNOSIS — Z13.31 POSITIVE DEPRESSION SCREENING: ICD-10-CM

## 2025-01-21 LAB — HBA1C MFR BLD: 6.2 %

## 2025-01-21 PROCEDURE — 83036 HEMOGLOBIN GLYCOSYLATED A1C: CPT | Performed by: NURSE PRACTITIONER

## 2025-01-21 PROCEDURE — 99214 OFFICE O/P EST MOD 30 MIN: CPT | Performed by: NURSE PRACTITIONER

## 2025-01-21 RX ORDER — DULOXETIN HYDROCHLORIDE 20 MG/1
20 CAPSULE, DELAYED RELEASE ORAL DAILY
Qty: 30 CAPSULE | Refills: 0 | Status: SHIPPED | OUTPATIENT
Start: 2025-01-21

## 2025-01-21 SDOH — ECONOMIC STABILITY: FOOD INSECURITY: WITHIN THE PAST 12 MONTHS, YOU WORRIED THAT YOUR FOOD WOULD RUN OUT BEFORE YOU GOT MONEY TO BUY MORE.: NEVER TRUE

## 2025-01-21 SDOH — ECONOMIC STABILITY: INCOME INSECURITY: IN THE LAST 12 MONTHS, WAS THERE A TIME WHEN YOU WERE NOT ABLE TO PAY THE MORTGAGE OR RENT ON TIME?: NO

## 2025-01-21 SDOH — ECONOMIC STABILITY: TRANSPORTATION INSECURITY
IN THE PAST 12 MONTHS, HAS LACK OF TRANSPORTATION KEPT YOU FROM MEETINGS, WORK, OR FROM GETTING THINGS NEEDED FOR DAILY LIVING?: NO

## 2025-01-21 SDOH — ECONOMIC STABILITY: FOOD INSECURITY: WITHIN THE PAST 12 MONTHS, THE FOOD YOU BOUGHT JUST DIDN'T LAST AND YOU DIDN'T HAVE MONEY TO GET MORE.: NEVER TRUE

## 2025-01-21 SDOH — ECONOMIC STABILITY: TRANSPORTATION INSECURITY
IN THE PAST 12 MONTHS, HAS THE LACK OF TRANSPORTATION KEPT YOU FROM MEDICAL APPOINTMENTS OR FROM GETTING MEDICATIONS?: NO

## 2025-01-21 ASSESSMENT — PATIENT HEALTH QUESTIONNAIRE - PHQ9
SUM OF ALL RESPONSES TO PHQ QUESTIONS 1-9: 17
8. MOVING OR SPEAKING SO SLOWLY THAT OTHER PEOPLE COULD HAVE NOTICED. OR THE OPPOSITE, BEING SO FIGETY OR RESTLESS THAT YOU HAVE BEEN MOVING AROUND A LOT MORE THAN USUAL: SEVERAL DAYS
SUM OF ALL RESPONSES TO PHQ QUESTIONS 1-9: 17
8. MOVING OR SPEAKING SO SLOWLY THAT OTHER PEOPLE COULD HAVE NOTICED. OR THE OPPOSITE - BEING SO FIDGETY OR RESTLESS THAT YOU HAVE BEEN MOVING AROUND A LOT MORE THAN USUAL: SEVERAL DAYS
7. TROUBLE CONCENTRATING ON THINGS, SUCH AS READING THE NEWSPAPER OR WATCHING TELEVISION: SEVERAL DAYS
2. FEELING DOWN, DEPRESSED OR HOPELESS: MORE THAN HALF THE DAYS
SUM OF ALL RESPONSES TO PHQ9 QUESTIONS 1 & 2: 5
1. LITTLE INTEREST OR PLEASURE IN DOING THINGS: NEARLY EVERY DAY
5. POOR APPETITE OR OVEREATING: NEARLY EVERY DAY
4. FEELING TIRED OR HAVING LITTLE ENERGY: NEARLY EVERY DAY
9. THOUGHTS THAT YOU WOULD BE BETTER OFF DEAD, OR OF HURTING YOURSELF: NOT AT ALL
6. FEELING BAD ABOUT YOURSELF - OR THAT YOU ARE A FAILURE OR HAVE LET YOURSELF OR YOUR FAMILY DOWN: SEVERAL DAYS
SUM OF ALL RESPONSES TO PHQ QUESTIONS 1-9: 17
9. THOUGHTS THAT YOU WOULD BE BETTER OFF DEAD, OR OF HURTING YOURSELF: NOT AT ALL
SUM OF ALL RESPONSES TO PHQ QUESTIONS 1-9: 17
7. TROUBLE CONCENTRATING ON THINGS, SUCH AS READING THE NEWSPAPER OR WATCHING TELEVISION: SEVERAL DAYS
6. FEELING BAD ABOUT YOURSELF - OR THAT YOU ARE A FAILURE OR HAVE LET YOURSELF OR YOUR FAMILY DOWN: SEVERAL DAYS
4. FEELING TIRED OR HAVING LITTLE ENERGY: NEARLY EVERY DAY
3. TROUBLE FALLING OR STAYING ASLEEP: NEARLY EVERY DAY
SUM OF ALL RESPONSES TO PHQ QUESTIONS 1-9: 17
SUM OF ALL RESPONSES TO PHQ9 QUESTIONS 1 & 2: 5
1. LITTLE INTEREST OR PLEASURE IN DOING THINGS: NEARLY EVERY DAY
5. POOR APPETITE OR OVEREATING: NEARLY EVERY DAY
3. TROUBLE FALLING OR STAYING ASLEEP: NEARLY EVERY DAY
2. FEELING DOWN, DEPRESSED OR HOPELESS: MORE THAN HALF THE DAYS
10. IF YOU CHECKED OFF ANY PROBLEMS, HOW DIFFICULT HAVE THESE PROBLEMS MADE IT FOR YOU TO DO YOUR WORK, TAKE CARE OF THINGS AT HOME, OR GET ALONG WITH OTHER PEOPLE: SOMEWHAT DIFFICULT
10. IF YOU CHECKED OFF ANY PROBLEMS, HOW DIFFICULT HAVE THESE PROBLEMS MADE IT FOR YOU TO DO YOUR WORK, TAKE CARE OF THINGS AT HOME, OR GET ALONG WITH OTHER PEOPLE: SOMEWHAT DIFFICULT

## 2025-01-21 ASSESSMENT — ENCOUNTER SYMPTOMS
SORE THROAT: 0
ABDOMINAL PAIN: 0
VOMITING: 0
CONSTIPATION: 0
SHORTNESS OF BREATH: 0
COUGH: 0
RHINORRHEA: 0
WHEEZING: 0
DIARRHEA: 0
FEELING OF CHOKING: 0
HYPERVENTILATION: 1
NAUSEA: 0

## 2025-01-21 NOTE — PATIENT INSTRUCTIONS
SURVEY:     You may be receiving a survey from [facility] [employer] regarding your visit today.     Please complete the survey to enable us to provide the highest quality of care to you and your family.     If you cannot score us a very good on any question, please call the office to discuss how we could have made your experience a very good one.     Thank you,    Luis Massey, APRN-CNP  Molly Nj, APRN-CNP  Romi, LPN  Ayleen, CMA  Da, CMA  Bina, CMA  Cathie, PCA  Lizzie, CMA  Elisabeth, PM

## 2025-01-21 NOTE — PROGRESS NOTES
Name: Aj Morris  : 1980         Chief Complaint:     Chief Complaint   Patient presents with    Anxiety     Anxiety, dizziness, left arm tingling, sweating, x 3 weeks.        History of Present Illness:      Aj Morris is a 44 y.o.  male who presents with Anxiety (Anxiety, dizziness, left arm tingling, sweating, x 3 weeks. )      Williams is here today for a routine office visit.    He states there has been quite a bit of increased stress in his life over the past several months.  He states there are issues related to his family and work.  His father recently was placed in a nursing home which was very stressful to him and he is having trouble with finding time to visit him.  He is also working extremely long hours at work.  He states he did recently change jobs to help out a friend but this is causing some additional issues.  He states he is irritable a lot of the time.  He states he is able to sleep.  He is drinking about 4-5 beers each night to help unwind.    Recently he said he became very upset at work and had some chest pain/palpitations.  He states he was able to relax in the pain went away.  He has had no further chest pain.  His blood pressure is little elevated in the office today.  See below for further comments.    Anxiety  Presents for initial visit. Onset was 1 to 4 weeks ago. The problem has been gradually worsening. Symptoms include decreased concentration, depressed mood, excessive worry, hyperventilation, irritability, malaise, muscle tension, nervous/anxious behavior, palpitations and restlessness. Patient reports no chest pain, compulsions, confusion, dizziness, dry mouth, feeling of choking, insomnia, nausea, obsessions, panic, shortness of breath or suicidal ideas. Symptoms occur most days. The severity of symptoms is causing significant distress and moderate. The symptoms are aggravated by work stress and family issues. The quality of sleep is good. Nighttime awakenings:

## 2025-02-04 ENCOUNTER — OFFICE VISIT (OUTPATIENT)
Dept: PRIMARY CARE CLINIC | Age: 45
End: 2025-02-04
Payer: COMMERCIAL

## 2025-02-04 VITALS
SYSTOLIC BLOOD PRESSURE: 128 MMHG | DIASTOLIC BLOOD PRESSURE: 74 MMHG | WEIGHT: 236 LBS | BODY MASS INDEX: 35.77 KG/M2 | TEMPERATURE: 97.2 F | HEART RATE: 70 BPM | OXYGEN SATURATION: 97 %

## 2025-02-04 DIAGNOSIS — F43.9 STRESS: ICD-10-CM

## 2025-02-04 PROCEDURE — 99213 OFFICE O/P EST LOW 20 MIN: CPT | Performed by: NURSE PRACTITIONER

## 2025-02-04 RX ORDER — DULOXETIN HYDROCHLORIDE 20 MG/1
20 CAPSULE, DELAYED RELEASE ORAL DAILY
Qty: 90 CAPSULE | Refills: 1 | Status: SHIPPED | OUTPATIENT
Start: 2025-02-04

## 2025-02-04 SDOH — ECONOMIC STABILITY: FOOD INSECURITY: WITHIN THE PAST 12 MONTHS, THE FOOD YOU BOUGHT JUST DIDN'T LAST AND YOU DIDN'T HAVE MONEY TO GET MORE.: NEVER TRUE

## 2025-02-04 SDOH — ECONOMIC STABILITY: FOOD INSECURITY: WITHIN THE PAST 12 MONTHS, YOU WORRIED THAT YOUR FOOD WOULD RUN OUT BEFORE YOU GOT MONEY TO BUY MORE.: NEVER TRUE

## 2025-02-04 ASSESSMENT — ENCOUNTER SYMPTOMS
FEELING OF CHOKING: 0
RHINORRHEA: 0
SORE THROAT: 0
SHORTNESS OF BREATH: 0
WHEEZING: 0
CONSTIPATION: 0
DIARRHEA: 0
VOMITING: 0
HYPERVENTILATION: 0
COUGH: 0
NAUSEA: 0
ABDOMINAL PAIN: 0

## 2025-02-04 ASSESSMENT — PATIENT HEALTH QUESTIONNAIRE - PHQ9
1. LITTLE INTEREST OR PLEASURE IN DOING THINGS: NOT AT ALL
8. MOVING OR SPEAKING SO SLOWLY THAT OTHER PEOPLE COULD HAVE NOTICED. OR THE OPPOSITE, BEING SO FIGETY OR RESTLESS THAT YOU HAVE BEEN MOVING AROUND A LOT MORE THAN USUAL: NOT AT ALL
6. FEELING BAD ABOUT YOURSELF - OR THAT YOU ARE A FAILURE OR HAVE LET YOURSELF OR YOUR FAMILY DOWN: NOT AT ALL
10. IF YOU CHECKED OFF ANY PROBLEMS, HOW DIFFICULT HAVE THESE PROBLEMS MADE IT FOR YOU TO DO YOUR WORK, TAKE CARE OF THINGS AT HOME, OR GET ALONG WITH OTHER PEOPLE: NOT DIFFICULT AT ALL
9. THOUGHTS THAT YOU WOULD BE BETTER OFF DEAD, OR OF HURTING YOURSELF: NOT AT ALL
7. TROUBLE CONCENTRATING ON THINGS, SUCH AS READING THE NEWSPAPER OR WATCHING TELEVISION: NOT AT ALL
SUM OF ALL RESPONSES TO PHQ9 QUESTIONS 1 & 2: 0
SUM OF ALL RESPONSES TO PHQ QUESTIONS 1-9: 0
2. FEELING DOWN, DEPRESSED OR HOPELESS: NOT AT ALL
5. POOR APPETITE OR OVEREATING: NOT AT ALL
4. FEELING TIRED OR HAVING LITTLE ENERGY: NOT AT ALL
3. TROUBLE FALLING OR STAYING ASLEEP: NOT AT ALL
SUM OF ALL RESPONSES TO PHQ QUESTIONS 1-9: 0

## 2025-02-04 NOTE — PATIENT INSTRUCTIONS
SURVEY:     You may be receiving a survey from [facility] regarding your visit today.     Please complete the survey to enable us to provide the highest quality of care to you and your family.     If you cannot score us a very good on any question, please call the office to discuss how we could have made your experience a very good one.     Thank you,    Luis Massey, APRN-CNP  Molly Nj, APRN-CNP  Romi, LPN  Ayleen, CMA  Da, CMA  Bina, CMA  Cathie, PCA  Lizzie, CMA  Elisabeth, PM

## 2025-02-05 NOTE — PROGRESS NOTES
Name: Aj Morris  : 1980         Chief Complaint:     Chief Complaint   Patient presents with    Mental Health Problem     2 week check. Patient doing well on duloxetine.        History of Present Illness:      Aj Morris is a 44 y.o.  male who presents with Mental Health Problem (2 week check. Patient doing well on duloxetine. )      Williams is here today for a routine office visit.    2025-He states there has been quite a bit of increased stress in his life over the past several months.  He states there are issues related to his family and work.  His father recently was placed in a nursing home which was very stressful to him and he is having trouble with finding time to visit him.  He is also working extremely long hours at work.  He states he did recently change jobs to help out a friend but this is causing some additional issues.  He states he is irritable a lot of the time.  He states he is able to sleep.  He is drinking about 4-5 beers each night to help unwind.    Recently he said he became very upset at work and had some chest pain/palpitations.  He states he was able to relax in the pain went away.  He has had no further chest pain.  His blood pressure is little elevated in the office today.      UPDATE 2025- feeling much better, better mood, less worry, not irritated with people. No issues with medication. Feels current does is very helpful. See below for further comments.    Anxiety  Presents for follow-up visit. Onset was 1 to 4 weeks ago. The problem has been gradually worsening. Patient reports no chest pain, compulsions, confusion, decreased concentration, depressed mood, dizziness, dry mouth, excessive worry, feeling of choking, hyperventilation, impotence, insomnia, irritability, malaise, muscle tension, nausea, nervous/anxious behavior, obsessions, palpitations, panic, restlessness, shortness of breath or suicidal ideas. Symptoms occur occasionally. The severity of

## 2025-04-21 ENCOUNTER — TELEPHONE (OUTPATIENT)
Dept: PRIMARY CARE CLINIC | Age: 45
End: 2025-04-21

## 2025-05-06 ENCOUNTER — OFFICE VISIT (OUTPATIENT)
Dept: PRIMARY CARE CLINIC | Age: 45
End: 2025-05-06
Payer: COMMERCIAL

## 2025-05-06 VITALS
SYSTOLIC BLOOD PRESSURE: 120 MMHG | RESPIRATION RATE: 18 BRPM | DIASTOLIC BLOOD PRESSURE: 84 MMHG | WEIGHT: 240 LBS | BODY MASS INDEX: 34.36 KG/M2 | TEMPERATURE: 98 F | HEART RATE: 71 BPM | OXYGEN SATURATION: 96 % | HEIGHT: 70 IN

## 2025-05-06 DIAGNOSIS — G47.33 OSA ON CPAP: ICD-10-CM

## 2025-05-06 DIAGNOSIS — F43.9 STRESS: Primary | ICD-10-CM

## 2025-05-06 PROCEDURE — 99214 OFFICE O/P EST MOD 30 MIN: CPT | Performed by: NURSE PRACTITIONER

## 2025-05-06 ASSESSMENT — ENCOUNTER SYMPTOMS
COUGH: 0
FEELING OF CHOKING: 0
WHEEZING: 0
SWOLLEN GLANDS: 0
SORE THROAT: 0
CONSTIPATION: 0
DIARRHEA: 0
ABDOMINAL PAIN: 0
NAUSEA: 0
VISUAL CHANGE: 0
HYPERVENTILATION: 0
CHANGE IN BOWEL HABIT: 0
RHINORRHEA: 0
VOMITING: 0
SHORTNESS OF BREATH: 0

## 2025-05-06 NOTE — PATIENT INSTRUCTIONS
SURVEY:     You may be receiving a survey from [facility] [employer] regarding your visit today.     Please complete the survey to enable us to provide the highest quality of care to you and your family.     If you cannot score us a very good on any question, please call the office to discuss how we could have made your experience a very good one.     Thank you,    Luis Massey, APRN-CNP  Molly Nj, APRN-CNP  Romi, LPN  Ayleen, CMA  Da, CMA  Bina, CMA  Cathie, PCA  Lizzie, CMA  Elisabeth, PM

## 2025-05-06 NOTE — PROGRESS NOTES
arthralgias, gait problem, joint swelling, myalgias, neck pain and neck stiffness.   Skin:  Negative for rash.   Neurological:  Negative for dizziness, vertigo, syncope, weakness, light-headedness, numbness and headaches.   Psychiatric/Behavioral:  Positive for sleep disturbance (STABLE). Negative for agitation, behavioral problems, confusion, decreased concentration, dysphoric mood, hallucinations, self-injury and suicidal ideas. The patient is not nervous/anxious, does not have insomnia and is not hyperactive.        Physical Exam:   Vitals:  /84 (BP Site: Left Upper Arm, Patient Position: Sitting)   Pulse 71   Temp 98 °F (36.7 °C) (Temporal)   Resp 18   Ht 1.778 m (5' 10\")   Wt 108.9 kg (240 lb)   SpO2 96%   BMI 34.44 kg/m²     Physical Exam  Vitals and nursing note reviewed.   Constitutional:       General: He is not in acute distress.     Appearance: Normal appearance. He is not ill-appearing.   HENT:      Mouth/Throat:      Mouth: Mucous membranes are moist.      Pharynx: Oropharynx is clear.   Eyes:      General: No scleral icterus.     Conjunctiva/sclera: Conjunctivae normal.   Cardiovascular:      Rate and Rhythm: Normal rate and regular rhythm.   Pulmonary:      Effort: Pulmonary effort is normal.      Breath sounds: Normal breath sounds.   Abdominal:      General: Bowel sounds are normal. There is no distension.      Palpations: Abdomen is soft.      Tenderness: There is no abdominal tenderness.   Musculoskeletal:      Cervical back: Normal range of motion and neck supple.      Right lower leg: No edema.      Left lower leg: No edema.   Skin:     General: Skin is warm and dry.      Findings: No rash.   Neurological:      Mental Status: He is alert and oriented to person, place, and time.   Psychiatric:         Attention and Perception: Attention normal.         Mood and Affect: Mood and affect normal. Mood is not anxious or depressed. Affect is not tearful.         Speech: Speech normal.

## 2025-05-14 ENCOUNTER — TELEMEDICINE ON DEMAND (OUTPATIENT)
Age: 45
End: 2025-05-14
Payer: COMMERCIAL

## 2025-05-14 DIAGNOSIS — J06.9 VIRAL URI WITH COUGH: Primary | ICD-10-CM

## 2025-05-14 PROCEDURE — 99213 OFFICE O/P EST LOW 20 MIN: CPT | Performed by: NURSE PRACTITIONER

## 2025-05-14 RX ORDER — BROMPHENIRAMINE MALEATE, PSEUDOEPHEDRINE HYDROCHLORIDE, AND DEXTROMETHORPHAN HYDROBROMIDE 2; 30; 10 MG/5ML; MG/5ML; MG/5ML
5-10 SYRUP ORAL 4 TIMES DAILY PRN
Qty: 200 ML | Refills: 0 | Status: SHIPPED | OUTPATIENT
Start: 2025-05-14

## 2025-05-14 ASSESSMENT — ENCOUNTER SYMPTOMS
SHORTNESS OF BREATH: 0
SWOLLEN GLANDS: 1
SINUS PAIN: 0
SORE THROAT: 1
COUGH: 1
WHEEZING: 1
RHINORRHEA: 1

## 2025-05-14 NOTE — PROGRESS NOTES
Aj Morris (:  1980) is a Established patient, here for evaluation of the following:    Cold Symptoms (X3 days, productive cough, green sputum, nasal congestion, sweats/chills)       Assessment & Plan:  Below is the assessment and plan developed based on review of pertinent history, physical exam, labs, studies, and medications.  1. Viral URI with cough  -     brompheniramine-pseudoephedrine-DM 2-30-10 MG/5ML syrup; Take 5-10 mLs by mouth 4 times daily as needed for Cough, Disp-200 mL, R-0Normal  Increase fluids, Rest, Use OTC pain reliever/fever reducer of choice, Teaspoon of honey for coughing fits. COVID/Flu test. Repeat after 48 hours to confirm negative if still symptomatic.  The patient would benefit from future follow up with their usual PCP. As of the end of their Virtualist Visit today, follow up visit status is as follows: Patient to follow up as previously instructed by PCP    Call if no improvement    Return if symptoms worsen or fail to improve.    Subjective:   Cold Symptoms   This is a new problem. The current episode started in the past 7 days. The problem has been gradually worsening. There has been no fever. Associated symptoms include congestion, coughing, headaches, rhinorrhea, sneezing, a sore throat, swollen glands and wheezing (improving). Pertinent negatives include no ear pain, plugged ear sensation or sinus pain. Treatments tried: dayquil. The treatment provided no relief.     Review of Systems   Constitutional:  Positive for chills, diaphoresis and fatigue. Negative for fever.   HENT:  Positive for congestion, postnasal drip, rhinorrhea, sneezing and sore throat. Negative for ear pain and sinus pain.    Respiratory:  Positive for cough and wheezing (improving). Negative for shortness of breath.    Neurological:  Positive for headaches.   Hematological:  Positive for adenopathy (submandibular).       Objective:  Patient-Reported Vitals  Patient-Reported Systolic (Top): 121

## 2025-06-27 ENCOUNTER — OFFICE VISIT (OUTPATIENT)
Dept: PRIMARY CARE CLINIC | Age: 45
End: 2025-06-27
Payer: COMMERCIAL

## 2025-06-27 VITALS
BODY MASS INDEX: 34.35 KG/M2 | OXYGEN SATURATION: 97 % | DIASTOLIC BLOOD PRESSURE: 76 MMHG | WEIGHT: 239.4 LBS | SYSTOLIC BLOOD PRESSURE: 120 MMHG | TEMPERATURE: 97.4 F | HEART RATE: 75 BPM

## 2025-06-27 DIAGNOSIS — R13.19 ESOPHAGEAL DYSPHAGIA: ICD-10-CM

## 2025-06-27 DIAGNOSIS — F43.9 STRESS: Primary | ICD-10-CM

## 2025-06-27 PROCEDURE — 99214 OFFICE O/P EST MOD 30 MIN: CPT | Performed by: NURSE PRACTITIONER

## 2025-06-27 RX ORDER — DULOXETIN HYDROCHLORIDE 20 MG/1
20 CAPSULE, DELAYED RELEASE ORAL DAILY
Qty: 90 CAPSULE | Refills: 1 | Status: SHIPPED | OUTPATIENT
Start: 2025-06-27

## 2025-06-27 RX ORDER — PANTOPRAZOLE SODIUM 40 MG/1
40 TABLET, DELAYED RELEASE ORAL
Qty: 90 TABLET | Refills: 1 | Status: SHIPPED | OUTPATIENT
Start: 2025-06-27

## 2025-06-27 SDOH — ECONOMIC STABILITY: FOOD INSECURITY: WITHIN THE PAST 12 MONTHS, YOU WORRIED THAT YOUR FOOD WOULD RUN OUT BEFORE YOU GOT MONEY TO BUY MORE.: NEVER TRUE

## 2025-06-27 SDOH — ECONOMIC STABILITY: FOOD INSECURITY: WITHIN THE PAST 12 MONTHS, THE FOOD YOU BOUGHT JUST DIDN'T LAST AND YOU DIDN'T HAVE MONEY TO GET MORE.: NEVER TRUE

## 2025-06-27 ASSESSMENT — ENCOUNTER SYMPTOMS
DIARRHEA: 0
FEELING OF CHOKING: 0
HYPERVENTILATION: 0
SORE THROAT: 0
WHEEZING: 0
SHORTNESS OF BREATH: 0
RHINORRHEA: 0
ABDOMINAL PAIN: 0
VOMITING: 0
CONSTIPATION: 0
TROUBLE SWALLOWING: 1
COUGH: 0
NAUSEA: 0
CHOKING: 1

## 2025-06-27 ASSESSMENT — PATIENT HEALTH QUESTIONNAIRE - PHQ9
SUM OF ALL RESPONSES TO PHQ QUESTIONS 1-9: 0
1. LITTLE INTEREST OR PLEASURE IN DOING THINGS: NOT AT ALL
2. FEELING DOWN, DEPRESSED OR HOPELESS: NOT AT ALL
SUM OF ALL RESPONSES TO PHQ QUESTIONS 1-9: 0

## 2025-06-27 NOTE — PROGRESS NOTES
Name: Aj Morris  : 1980         Chief Complaint:     Chief Complaint   Patient presents with    Anxiety     Would like to continue duloxetine.     Other     Issues with swallowing, food getting stuck.       History of Present Illness:      Aj Morris is a 45 y.o.  male who presents with Anxiety (Would like to continue duloxetine. ) and Other (Issues with swallowing, food getting stuck.)      Williams is here today for a routine office visit.    He states he did try to wean off of the duloxetine as we discussed at last visit.  He thought he was not needing the medication any longer.  He states after he did wean and got to the every third day dosing he felt irritable.  He states he would like to just continue the medication at the current dose.  He states he feels more relaxed and less edgy while taking the medication.  See below for further comments.    Dysphagia-patient states he has had a few episodes of trouble swallowing or food getting stuck.  He states he has not had trouble breathing but has had trouble with being able to drink water or swallow anything during the episode.  He states eventually the food passes.  He states recently this happened with a piece of chicken.  He still feels a little discomfort in his throat.  See below for further comment.        Anxiety  Presents for follow-up visit. Patient reports no chest pain, compulsions, confusion, decreased concentration, depressed mood, dizziness, dry mouth, excessive worry, feeling of choking, hyperventilation, impotence, insomnia, irritability, malaise, muscle tension, nausea, nervous/anxious behavior, obsessions, palpitations, panic, restlessness, shortness of breath or suicidal ideas. Symptoms occur occasionally. The severity of symptoms is mild. The quality of sleep is good. Nighttime awakenings: occasional.     Compliance with medications is %.   Choking  This is a recurrent problem. The current episode started more than 1 month

## 2025-06-27 NOTE — PATIENT INSTRUCTIONS
SURVEY:     You may be receiving a survey from [facility] [employer] regarding your visit today.     Please complete the survey to enable us to provide the highest quality of care to you and your family.     If you cannot score us a very good on any question, please call the office to discuss how we could have made your experience a very good one.     Thank you,    Luis Massey, APRN-CNP  Molly Nj, APRN-CNP  Romi, LPN  Ayleen, CMA  Da, CMA  Bina, CMA  Cathie, PCA  Lizzie, CMA  Elisabeth, PM